# Patient Record
Sex: MALE | Race: BLACK OR AFRICAN AMERICAN | NOT HISPANIC OR LATINO | URBAN - METROPOLITAN AREA
[De-identification: names, ages, dates, MRNs, and addresses within clinical notes are randomized per-mention and may not be internally consistent; named-entity substitution may affect disease eponyms.]

---

## 2024-02-12 DIAGNOSIS — G89.29 CHRONIC PAIN OF RIGHT KNEE: ICD-10-CM

## 2024-02-12 DIAGNOSIS — M25.561 CHRONIC PAIN OF RIGHT KNEE: ICD-10-CM

## 2024-02-13 ENCOUNTER — LAB (OUTPATIENT)
Dept: LAB | Facility: LAB | Age: 54
End: 2024-02-13
Payer: COMMERCIAL

## 2024-02-13 ENCOUNTER — HOSPITAL ENCOUNTER (OUTPATIENT)
Dept: RADIOLOGY | Facility: HOSPITAL | Age: 54
Discharge: HOME | End: 2024-02-13
Payer: COMMERCIAL

## 2024-02-13 ENCOUNTER — OFFICE VISIT (OUTPATIENT)
Dept: ORTHOPEDIC SURGERY | Facility: HOSPITAL | Age: 54
End: 2024-02-13
Payer: COMMERCIAL

## 2024-02-13 VITALS — BODY MASS INDEX: 39.86 KG/M2 | WEIGHT: 248 LBS | HEIGHT: 66 IN

## 2024-02-13 DIAGNOSIS — Z96.651 STATUS POST TOTAL RIGHT KNEE REPLACEMENT: ICD-10-CM

## 2024-02-13 DIAGNOSIS — M25.561 CHRONIC PAIN OF RIGHT KNEE: ICD-10-CM

## 2024-02-13 DIAGNOSIS — Z96.651 STATUS POST TOTAL RIGHT KNEE REPLACEMENT: Primary | ICD-10-CM

## 2024-02-13 DIAGNOSIS — G89.29 CHRONIC PAIN OF RIGHT KNEE: ICD-10-CM

## 2024-02-13 LAB
BASOPHILS # BLD AUTO: 0.02 X10*3/UL (ref 0–0.1)
BASOPHILS NFR BLD AUTO: 0.3 %
CRP SERPL-MCNC: 0.45 MG/DL
EOSINOPHIL # BLD AUTO: 0.08 X10*3/UL (ref 0–0.7)
EOSINOPHIL NFR BLD AUTO: 1.1 %
ERYTHROCYTE [DISTWIDTH] IN BLOOD BY AUTOMATED COUNT: 16.8 % (ref 11.5–14.5)
ERYTHROCYTE [SEDIMENTATION RATE] IN BLOOD BY WESTERGREN METHOD: 8 MM/H (ref 0–20)
HCT VFR BLD AUTO: 39.1 % (ref 41–52)
HGB BLD-MCNC: 13.8 G/DL (ref 13.5–17.5)
IMM GRANULOCYTES # BLD AUTO: 0.01 X10*3/UL (ref 0–0.7)
IMM GRANULOCYTES NFR BLD AUTO: 0.1 % (ref 0–0.9)
LYMPHOCYTES # BLD AUTO: 2.18 X10*3/UL (ref 1.2–4.8)
LYMPHOCYTES NFR BLD AUTO: 30.8 %
MCH RBC QN AUTO: 25.8 PG (ref 26–34)
MCHC RBC AUTO-ENTMCNC: 35.3 G/DL (ref 32–36)
MCV RBC AUTO: 73 FL (ref 80–100)
MONOCYTES # BLD AUTO: 0.61 X10*3/UL (ref 0.1–1)
MONOCYTES NFR BLD AUTO: 8.6 %
NEUTROPHILS # BLD AUTO: 4.17 X10*3/UL (ref 1.2–7.7)
NEUTROPHILS NFR BLD AUTO: 59.1 %
NRBC BLD-RTO: 0 /100 WBCS (ref 0–0)
PLATELET # BLD AUTO: 172 X10*3/UL (ref 150–450)
RBC # BLD AUTO: 5.35 X10*6/UL (ref 4.5–5.9)
WBC # BLD AUTO: 7.1 X10*3/UL (ref 4.4–11.3)

## 2024-02-13 PROCEDURE — 85025 COMPLETE CBC W/AUTO DIFF WBC: CPT

## 2024-02-13 PROCEDURE — 73560 X-RAY EXAM OF KNEE 1 OR 2: CPT | Mod: RIGHT SIDE | Performed by: STUDENT IN AN ORGANIZED HEALTH CARE EDUCATION/TRAINING PROGRAM

## 2024-02-13 PROCEDURE — 73560 X-RAY EXAM OF KNEE 1 OR 2: CPT | Mod: RT

## 2024-02-13 PROCEDURE — 36415 COLL VENOUS BLD VENIPUNCTURE: CPT

## 2024-02-13 PROCEDURE — 86140 C-REACTIVE PROTEIN: CPT

## 2024-02-13 PROCEDURE — 99204 OFFICE O/P NEW MOD 45 MIN: CPT | Performed by: STUDENT IN AN ORGANIZED HEALTH CARE EDUCATION/TRAINING PROGRAM

## 2024-02-13 PROCEDURE — 82495 ASSAY OF CHROMIUM: CPT

## 2024-02-13 PROCEDURE — 83018 HEAVY METAL QUAN EACH NES: CPT

## 2024-02-13 PROCEDURE — 1036F TOBACCO NON-USER: CPT | Performed by: STUDENT IN AN ORGANIZED HEALTH CARE EDUCATION/TRAINING PROGRAM

## 2024-02-13 PROCEDURE — 85652 RBC SED RATE AUTOMATED: CPT

## 2024-02-13 PROCEDURE — 99214 OFFICE O/P EST MOD 30 MIN: CPT | Mod: 25 | Performed by: STUDENT IN AN ORGANIZED HEALTH CARE EDUCATION/TRAINING PROGRAM

## 2024-02-13 RX ORDER — ALLOPURINOL 300 MG/1
300 TABLET ORAL DAILY
COMMUNITY

## 2024-02-13 RX ORDER — LISINOPRIL 30 MG/1
30 TABLET ORAL DAILY
COMMUNITY

## 2024-02-13 RX ORDER — CETIRIZINE HYDROCHLORIDE 10 MG/1
10 TABLET, CHEWABLE ORAL DAILY
COMMUNITY

## 2024-02-13 RX ORDER — AMLODIPINE BESYLATE 5 MG/1
5 TABLET ORAL DAILY
COMMUNITY

## 2024-02-13 RX ORDER — DICLOFENAC SODIUM 75 MG/1
75 TABLET, DELAYED RELEASE ORAL 2 TIMES DAILY
COMMUNITY
Start: 2024-02-02

## 2024-02-13 ASSESSMENT — ENCOUNTER SYMPTOMS
LOSS OF SENSATION IN FEET: 0
DEPRESSION: 0
OCCASIONAL FEELINGS OF UNSTEADINESS: 0

## 2024-02-13 ASSESSMENT — PAIN SCALES - GENERAL: PAINLEVEL_OUTOF10: 7

## 2024-02-13 ASSESSMENT — PAIN - FUNCTIONAL ASSESSMENT: PAIN_FUNCTIONAL_ASSESSMENT: 0-10

## 2024-02-13 NOTE — LETTER
February 13, 2024     Patient: Abdirizak Hannon   YOB: 1970   Date of Visit: 2/13/2024       To Whom It May Concern:    It is my medical opinion that Abdirizak Hannon may return to work on 2/14/2024 .    If you have any questions or concerns, please don't hesitate to call.         Sincerely,        Ihsan Rousseau MD    CC: No Recipients

## 2024-02-13 NOTE — PROGRESS NOTES
"Orthopaedic Surgery  New Patient Clinic Note    Abdirizak Oaks [id number] February 13, 2024    Reason for Consult: Painful right total knee arthroplasty    HPI: This is a pleasant 53-year-old male with a history of morbid obesity, hypertension, gout as well as chronic kidney disease status post right total knee arthroplasty December 2018 followed by revision total knee arthroplasty for tibial loosening on October 2019 by Dr. Pena.  He presents for continued right knee pain.  He most recently saw Dr. Ray at [facility] for his continued complaints.  He states the right knee has never been \"quite right\" after his initial surgery and even after revision he still had problems with it.  He is complaining of stiffness and pain.  He states that he has had his right hip as well as left knee done and never really had a ton of problems with either of those.  He denies any falls or trauma.  Denies numbness or tingling.  His pain is located to the medial lateral sides of the knee.  He has been seen by multiple surgeons at [facility] who have all told him there is nothing more they can do and they saw no evidence of a cause of pain for his right total knee arthroplasty.    ROS:  15 point review of systems collected per intake sheet and negative except for as noted in HPI.    PMH: History reviewed. No pertinent past medical history. No history of DVT.     PSH:  History reviewed. No pertinent surgical history.     SHx: No smoking. No IVDU    Meds:   Current Outpatient Medications on File Prior to Visit   Medication Sig Dispense Refill    allopurinol (Zyloprim) 300 mg tablet Take 1 tablet (300 mg) by mouth once daily.      amLODIPine (Norvasc) 5 mg tablet Take 1 tablet (5 mg) by mouth once daily.      cetirizine (ZyrTEC) 10 mg chewable tablet Chew 1 tablet (10 mg) once daily.      diclofenac (Voltaren) 75 mg EC tablet Take 1 tablet (75 mg) by mouth 2 times a day.      lisinopril 30 mg tablet Take 1 tablet (30 mg) by mouth once daily.       No " "current facility-administered medications on file prior to visit.       PHYSICAL EXAM    GEN: AaOx4, NAD  HEENT: normocephalic atraumatic, EOMI, MMM, pupils equal and round  PSYCH: appropriate mood and affect  RESP: nonlabored breathing   CARDIAC: Extremities WWP, RRR to peripheral palpation  NEURO: CN 2-12 grossly intact  SKIN: Atraumatic    Physical exam the right lower extremity shows a healed surgical incision with no evidence of erythema or warmth.  There is no obvious knee joint effusion.  He has full knee extension and can flex to approximate 95 degrees.  EHL, dorsiflexion and plantarflexion are intact.  He has palpable pulses brisk cap refill C.  Sensation is intact light touch in all distributions.  His knee is stable to varus valgus stress.    Imaging:    XR of the right knee, obtained and personally reviewed today, shows total knee arthroplasty with overall good position of all orthopedic components.  There is slight superior placement of his patellar button and he has a cemented revision tibial component with lucency around the cement mantle but overall no change compared to his previous films which were obtained in April 2023.  The femoral component appears well-fixed.      Assessment:    53-year-old male with painful right total knee arthroplasty    Plan:    I had a long discussion with the patient as well as his wife that sometimes patients certainly are not entirely satisfied with her total knee arthroplasty especially after revision and may never feel quite \"normal\" moving forward.  Ultimately he is having extensive workup at [facility] prior including laboratory work which was previously negative as well as bone scan which showed no evidence of loosening or infection.  Ultimately I did tell them that based off the workup I seen I have no definitive cause of his painful total knee arthroplasty and that ultimately we can try additional workup including repeat of his blood work and maybe a CT scan to see if " there is anything else we can identify as a potential cause of pain.  We could also at some point repeat a bone scan to see if there is any development of loosening over time but ultimately this all may come back as negative which may leave them in the same spot they are now which is certainly a frustrating thing to go through.  There is also an issue with potentially insurance covering this and I certainly do not want to put them to a large financial burden.  If ultimately they are unwilling to go through a large workup we could always send him to pain management to see if there are some nonnarcotic pain regimens which they could be considered for potentially nerve ablations which we have sometimes rarely used and painful arthroplasty without any explanation.  I also informed them that certainly some patients were not entirely satisfied with her arthroplasty and this may be something he has to live with moving forward.  They are understanding for right now they want to start with the laboratory work since that is the simple thing to start with afterwards they will have a discussion with me via virtual visit to decide whether or not they like to see pain management or potentially restart a workup to see if we can find any cause for his painful total knee arthroplasty.

## 2024-02-15 LAB
COBALT SERPL-MCNC: <1 UG/L
CR SERPL-MCNC: <1 UG/L

## 2024-02-28 ENCOUNTER — TELEMEDICINE (OUTPATIENT)
Dept: ORTHOPEDIC SURGERY | Facility: CLINIC | Age: 54
End: 2024-02-28
Payer: COMMERCIAL

## 2024-02-28 VITALS — HEIGHT: 66 IN | WEIGHT: 248 LBS | BODY MASS INDEX: 39.86 KG/M2

## 2024-02-28 PROCEDURE — 99213 OFFICE O/P EST LOW 20 MIN: CPT | Performed by: STUDENT IN AN ORGANIZED HEALTH CARE EDUCATION/TRAINING PROGRAM

## 2024-02-28 PROCEDURE — 1036F TOBACCO NON-USER: CPT | Performed by: STUDENT IN AN ORGANIZED HEALTH CARE EDUCATION/TRAINING PROGRAM

## 2024-02-28 ASSESSMENT — PAIN - FUNCTIONAL ASSESSMENT: PAIN_FUNCTIONAL_ASSESSMENT: 0-10

## 2024-02-28 ASSESSMENT — PAIN SCALES - GENERAL: PAINLEVEL_OUTOF10: 7

## 2024-02-28 ASSESSMENT — ENCOUNTER SYMPTOMS
LOSS OF SENSATION IN FEET: 0
OCCASIONAL FEELINGS OF UNSTEADINESS: 0
DEPRESSION: 0

## 2024-02-28 NOTE — PROGRESS NOTES
Orthopaedic Surgery Clinic Progress Note:    CC: Follow-up laboratory workup    S: 53 y.o. male with a history of right total knee arthroplasty which is continue to be painful.  I previously saw him and discussed options for continued workup he had quite an extensive workup already at [facility].  He stated that he wanted to repeat blood work before deciding on whether or not he would like to proceed with any other investigatory testing.  He is here to go over his blood work results as well as see there is any other questions, concerns or new symptoms.  He states currently his pain is unchanged.  Denies any new swelling.  Denies any falls or trauma.    Objective:    Exam:  Gen: alert, appropriately conversational, no apparent distress    Physical exam is unable to be performed due to the virtual nature of the visit patient is overall well-appearing without apparent distress    Imaging:  XR of the right knee, obtained and personally reviewed today, shows total knee arthroplasty with overall good position of all orthopedic components.  There is slight superior placement of his patellar button and he has a cemented revision tibial component with lucency around the cement mantle but overall no change compared to his previous films which were obtained in April 2023.  The femoral component appears well-fixed.     Laboratory:  Laboratory workup which was previously ordered shows a normal CBC/differential with no elevation of the ESR and CRP.  He has normal metal ion markers including cobalt and chromium.    A/P:    I discussed with the patient that at this time I see no laboratory evidence of anything concerning and his previous workup at [facility] which was fairly extensive showed no evidence of obvious signs of a problem which could be fixed with a revision surgery.  From my standpoint I told him that we could certainly try to repeat these test to see if time is changed anything and maybe one of them would now show something.  There  is also a chance that by repeating the test the results will be unchanged and ultimately because he does not want to go a large financial burden for this he decided he like to hold off on any additional testing at this time.  I told to call the office with any issues, concerns or if he has a change in symptoms and would like further workup in the future.